# Patient Record
Sex: MALE | URBAN - METROPOLITAN AREA
[De-identification: names, ages, dates, MRNs, and addresses within clinical notes are randomized per-mention and may not be internally consistent; named-entity substitution may affect disease eponyms.]

---

## 2017-03-22 ENCOUNTER — INPATIENT (INPATIENT)
Facility: HOSPITAL | Age: 77
LOS: 1 days | Discharge: PSYCHIATRIC OR SUBSTANCE USE FACILITY | End: 2017-03-24
Attending: HOSPITALIST | Admitting: HOSPITALIST
Payer: MEDICAID

## 2017-03-22 VITALS
SYSTOLIC BLOOD PRESSURE: 124 MMHG | DIASTOLIC BLOOD PRESSURE: 73 MMHG | TEMPERATURE: 99 F | OXYGEN SATURATION: 97 % | RESPIRATION RATE: 18 BRPM | HEART RATE: 60 BPM

## 2017-03-22 DIAGNOSIS — R06.00 DYSPNEA, UNSPECIFIED: ICD-10-CM

## 2017-03-22 LAB
ALBUMIN SERPL ELPH-MCNC: 4.2 G/DL — SIGNIFICANT CHANGE UP (ref 3.3–5)
ALP SERPL-CCNC: 47 U/L — SIGNIFICANT CHANGE UP (ref 40–120)
ALT FLD-CCNC: 32 U/L — SIGNIFICANT CHANGE UP (ref 4–41)
APTT BLD: 31.9 SEC — SIGNIFICANT CHANGE UP (ref 27.5–37.4)
AST SERPL-CCNC: 26 U/L — SIGNIFICANT CHANGE UP (ref 4–40)
BASOPHILS # BLD AUTO: 0.03 K/UL — SIGNIFICANT CHANGE UP (ref 0–0.2)
BASOPHILS NFR BLD AUTO: 0.4 % — SIGNIFICANT CHANGE UP (ref 0–2)
BILIRUB SERPL-MCNC: 1.5 MG/DL — HIGH (ref 0.2–1.2)
BUN SERPL-MCNC: 13 MG/DL — SIGNIFICANT CHANGE UP (ref 7–23)
CALCIUM SERPL-MCNC: 9.7 MG/DL — SIGNIFICANT CHANGE UP (ref 8.4–10.5)
CHLORIDE SERPL-SCNC: 103 MMOL/L — SIGNIFICANT CHANGE UP (ref 98–107)
CK MB BLD-MCNC: 1.17 NG/ML — SIGNIFICANT CHANGE UP (ref 1–6.6)
CK SERPL-CCNC: 69 U/L — SIGNIFICANT CHANGE UP (ref 30–200)
CO2 SERPL-SCNC: 27 MMOL/L — SIGNIFICANT CHANGE UP (ref 22–31)
CREAT SERPL-MCNC: 0.87 MG/DL — SIGNIFICANT CHANGE UP (ref 0.5–1.3)
EOSINOPHIL # BLD AUTO: 0.21 K/UL — SIGNIFICANT CHANGE UP (ref 0–0.5)
EOSINOPHIL NFR BLD AUTO: 3.1 % — SIGNIFICANT CHANGE UP (ref 0–6)
GLUCOSE SERPL-MCNC: 83 MG/DL — SIGNIFICANT CHANGE UP (ref 70–99)
HCT VFR BLD CALC: 43.2 % — SIGNIFICANT CHANGE UP (ref 39–50)
HGB BLD-MCNC: 15.7 G/DL — SIGNIFICANT CHANGE UP (ref 13–17)
IMM GRANULOCYTES NFR BLD AUTO: 0.3 % — SIGNIFICANT CHANGE UP (ref 0–1.5)
INR BLD: 1.07 — SIGNIFICANT CHANGE UP (ref 0.88–1.17)
LYMPHOCYTES # BLD AUTO: 2.1 K/UL — SIGNIFICANT CHANGE UP (ref 1–3.3)
LYMPHOCYTES # BLD AUTO: 31.4 % — SIGNIFICANT CHANGE UP (ref 13–44)
MCHC RBC-ENTMCNC: 32.4 PG — SIGNIFICANT CHANGE UP (ref 27–34)
MCHC RBC-ENTMCNC: 36.3 % — HIGH (ref 32–36)
MCV RBC AUTO: 89.3 FL — SIGNIFICANT CHANGE UP (ref 80–100)
MONOCYTES # BLD AUTO: 0.54 K/UL — SIGNIFICANT CHANGE UP (ref 0–0.9)
MONOCYTES NFR BLD AUTO: 8.1 % — SIGNIFICANT CHANGE UP (ref 2–14)
NEUTROPHILS # BLD AUTO: 3.78 K/UL — SIGNIFICANT CHANGE UP (ref 1.8–7.4)
NEUTROPHILS NFR BLD AUTO: 56.7 % — SIGNIFICANT CHANGE UP (ref 43–77)
PLATELET # BLD AUTO: 161 K/UL — SIGNIFICANT CHANGE UP (ref 150–400)
PMV BLD: 10.5 FL — SIGNIFICANT CHANGE UP (ref 7–13)
POTASSIUM SERPL-MCNC: 4.1 MMOL/L — SIGNIFICANT CHANGE UP (ref 3.5–5.3)
POTASSIUM SERPL-SCNC: 4.1 MMOL/L — SIGNIFICANT CHANGE UP (ref 3.5–5.3)
PROT SERPL-MCNC: 7.2 G/DL — SIGNIFICANT CHANGE UP (ref 6–8.3)
PROTHROM AB SERPL-ACNC: 12 SEC — SIGNIFICANT CHANGE UP (ref 9.8–13.1)
RBC # BLD: 4.84 M/UL — SIGNIFICANT CHANGE UP (ref 4.2–5.8)
RBC # FLD: 12.8 % — SIGNIFICANT CHANGE UP (ref 10.3–14.5)
SODIUM SERPL-SCNC: 142 MMOL/L — SIGNIFICANT CHANGE UP (ref 135–145)
TROPONIN T SERPL-MCNC: < 0.06 NG/ML — SIGNIFICANT CHANGE UP (ref 0–0.06)
TROPONIN T SERPL-MCNC: < 0.06 NG/ML — SIGNIFICANT CHANGE UP (ref 0–0.06)
WBC # BLD: 6.68 K/UL — SIGNIFICANT CHANGE UP (ref 3.8–10.5)
WBC # FLD AUTO: 6.68 K/UL — SIGNIFICANT CHANGE UP (ref 3.8–10.5)

## 2017-03-22 PROCEDURE — 71020: CPT | Mod: 26

## 2017-03-22 RX ORDER — ATORVASTATIN CALCIUM 80 MG/1
20 TABLET, FILM COATED ORAL AT BEDTIME
Qty: 0 | Refills: 0 | Status: DISCONTINUED | OUTPATIENT
Start: 2017-03-22 | End: 2017-03-24

## 2017-03-22 RX ORDER — MULTIVIT WITH MIN/MFOLATE/K2 340-15/3 G
300 POWDER (GRAM) ORAL
Qty: 0 | Refills: 0 | COMMUNITY

## 2017-03-22 RX ORDER — HALOPERIDOL DECANOATE 100 MG/ML
10 INJECTION INTRAMUSCULAR AT BEDTIME
Qty: 0 | Refills: 0 | Status: DISCONTINUED | OUTPATIENT
Start: 2017-03-22 | End: 2017-03-24

## 2017-03-22 RX ORDER — DOCUSATE SODIUM 100 MG
2 CAPSULE ORAL
Qty: 0 | Refills: 0 | COMMUNITY

## 2017-03-22 RX ORDER — DOCUSATE SODIUM 100 MG
100 CAPSULE ORAL
Qty: 0 | Refills: 0 | Status: DISCONTINUED | OUTPATIENT
Start: 2017-03-22 | End: 2017-03-24

## 2017-03-22 RX ORDER — HALOPERIDOL DECANOATE 100 MG/ML
1 INJECTION INTRAMUSCULAR
Qty: 0 | Refills: 0 | COMMUNITY

## 2017-03-22 RX ORDER — ASPIRIN/CALCIUM CARB/MAGNESIUM 324 MG
81 TABLET ORAL DAILY
Qty: 0 | Refills: 0 | Status: DISCONTINUED | OUTPATIENT
Start: 2017-03-22 | End: 2017-03-24

## 2017-03-22 RX ORDER — ROSUVASTATIN CALCIUM 5 MG/1
1 TABLET ORAL
Qty: 0 | Refills: 0 | COMMUNITY

## 2017-03-22 RX ORDER — ASPIRIN/CALCIUM CARB/MAGNESIUM 324 MG
1 TABLET ORAL
Qty: 0 | Refills: 0 | COMMUNITY

## 2017-03-22 NOTE — ED ADULT NURSE NOTE - OBJECTIVE STATEMENT
Received pt. in rm 4, A&Ox4, presents to ER from [facility] with 2 aides at bedside. c/o sob, dizziness and nausea since last night. Denies cp, palpitations, vomiting or fever/chills. #20G IV placed in left AC; labs sent; MD at bedside for eval. VS done as charted; breathing well on RA, non-labored. Will continue to monitor. Received pt. in rm 4, A&Ox4, presents to ER from [facility] with 2 aides at bedside. Pt. primarily mandarin speaking; aide at bedside translating. c/o sob, dizziness and nausea since last night. Denies cp, palpitations, vomiting or fever/chills. #20G IV placed in left AC; labs sent; MD at bedside for eval. VS done as charted; breathing well on RA, non-labored. Will continue to monitor.

## 2017-03-22 NOTE — ED PROVIDER NOTE - OBJECTIVE STATEMENT
C/o dyspnea since last night.  Sx woke up patient yesterday evening and persisted into this morning. denies chest pain, only dyspnea.  No nausea.  No other complaints.  No h/o cad or other cardiac history, per pt

## 2017-03-22 NOTE — ED PROVIDER NOTE - MEDICAL DECISION MAKING DETAILS
dyspnea - no cp but poor historian and pt is elderly.  Will check d-dimer out of concern for pe, and r/o acs dyspnea - no cp but poor historian and pt is elderly.  Will check d-dimer out of concern for pe, and r/o acs.  D Dimer WNL, suggesting PE unlikely. Initial troponin negative and HEART score 3 (low) based on risk factors of age and HLD. He has no acute radiographic findings on plain film (though formal read pending). Plan for repeat troponin to trend.

## 2017-03-22 NOTE — ED ADULT NURSE REASSESSMENT NOTE - NS ED NURSE REASSESS COMMENT FT1
Received report from Manolo Mccall. Pt [id number] with credesmore aids at bedside. NSR on cardiac monitor. Pt offers no complaints. Will monitor.

## 2017-03-22 NOTE — ED PROVIDER NOTE - NEUROLOGICAL SPEECH
([facility] staff interpreting Mandarin, but speech appears appropriate, and Pt understands and speaks some basic English)/clear

## 2017-03-22 NOTE — ED PROVIDER NOTE - PROGRESS NOTE DETAILS
MD Bettye - Initial troponin negative. D Dimer also WNL. No acute findings on CXR (formal read pending). Pt denies CP, reiterates SOB. HEART Score low (only risk factor is age and HLD). Therefore, plan for repeat (4 hour) troponin. tele PA communicated with

## 2017-03-22 NOTE — ED ADULT TRIAGE NOTE - CHIEF COMPLAINT QUOTE
arrives from [facility] (h/o schizophrenia).  c/o SOB "in the morning" x 2 days. states he feels it when he takes a deep breath..." a chest tightness."   denies fever/cough.

## 2017-03-22 NOTE — ED PROVIDER NOTE - SHIFT CHANGE DETAILS
signed out pending second set of cardiac enzymes.  If negative, may be d/krish home as I feel this is a very low probability for acs

## 2017-03-22 NOTE — ED ADULT NURSE NOTE - CHIEF COMPLAINT QUOTE
arrives from [facility] (h/o schizophrenia).  c/o SOB "in the morning" x 2 days. states he feels it when he takes a deep breath..." a chest tightness."   denies fever/cough.

## 2017-03-23 DIAGNOSIS — F25.9 SCHIZOAFFECTIVE DISORDER, UNSPECIFIED: ICD-10-CM

## 2017-03-23 DIAGNOSIS — R00.1 BRADYCARDIA, UNSPECIFIED: ICD-10-CM

## 2017-03-23 DIAGNOSIS — R06.00 DYSPNEA, UNSPECIFIED: ICD-10-CM

## 2017-03-23 DIAGNOSIS — Z41.8 ENCOUNTER FOR OTHER PROCEDURES FOR PURPOSES OTHER THAN REMEDYING HEALTH STATE: ICD-10-CM

## 2017-03-23 DIAGNOSIS — E78.00 PURE HYPERCHOLESTEROLEMIA, UNSPECIFIED: ICD-10-CM

## 2017-03-23 LAB
24R-OH-CALCIDIOL SERPL-MCNC: 25.2 NG/ML — LOW (ref 30–100)
BASOPHILS # BLD AUTO: 0.03 K/UL — SIGNIFICANT CHANGE UP (ref 0–0.2)
BASOPHILS NFR BLD AUTO: 0.3 % — SIGNIFICANT CHANGE UP (ref 0–2)
BUN SERPL-MCNC: 13 MG/DL — SIGNIFICANT CHANGE UP (ref 7–23)
CALCIUM SERPL-MCNC: 9.3 MG/DL — SIGNIFICANT CHANGE UP (ref 8.4–10.5)
CHLORIDE SERPL-SCNC: 104 MMOL/L — SIGNIFICANT CHANGE UP (ref 98–107)
CHOLEST SERPL-MCNC: 132 MG/DL — SIGNIFICANT CHANGE UP (ref 120–199)
CK MB BLD-MCNC: 1 NG/ML — SIGNIFICANT CHANGE UP (ref 1–6.6)
CK MB BLD-MCNC: SIGNIFICANT CHANGE UP (ref 0–2.5)
CK SERPL-CCNC: 57 U/L — SIGNIFICANT CHANGE UP (ref 30–200)
CO2 SERPL-SCNC: 22 MMOL/L — SIGNIFICANT CHANGE UP (ref 22–31)
CREAT SERPL-MCNC: 0.69 MG/DL — SIGNIFICANT CHANGE UP (ref 0.5–1.3)
EOSINOPHIL # BLD AUTO: 0.4 K/UL — SIGNIFICANT CHANGE UP (ref 0–0.5)
EOSINOPHIL NFR BLD AUTO: 4.5 % — SIGNIFICANT CHANGE UP (ref 0–6)
GLUCOSE SERPL-MCNC: 102 MG/DL — HIGH (ref 70–99)
HBA1C BLD-MCNC: 5.5 % — SIGNIFICANT CHANGE UP (ref 4–5.6)
HCT VFR BLD CALC: 42.5 % — SIGNIFICANT CHANGE UP (ref 39–50)
HDLC SERPL-MCNC: 40 MG/DL — SIGNIFICANT CHANGE UP (ref 35–55)
HGB BLD-MCNC: 15.3 G/DL — SIGNIFICANT CHANGE UP (ref 13–17)
IMM GRANULOCYTES NFR BLD AUTO: 0.3 % — SIGNIFICANT CHANGE UP (ref 0–1.5)
LIPID PNL WITH DIRECT LDL SERPL: 81 MG/DL — SIGNIFICANT CHANGE UP
LYMPHOCYTES # BLD AUTO: 2.69 K/UL — SIGNIFICANT CHANGE UP (ref 1–3.3)
LYMPHOCYTES # BLD AUTO: 30.4 % — SIGNIFICANT CHANGE UP (ref 13–44)
MAGNESIUM SERPL-MCNC: 2.3 MG/DL — SIGNIFICANT CHANGE UP (ref 1.6–2.6)
MCHC RBC-ENTMCNC: 31.7 PG — SIGNIFICANT CHANGE UP (ref 27–34)
MCHC RBC-ENTMCNC: 36 % — SIGNIFICANT CHANGE UP (ref 32–36)
MCV RBC AUTO: 88 FL — SIGNIFICANT CHANGE UP (ref 80–100)
MONOCYTES # BLD AUTO: 0.49 K/UL — SIGNIFICANT CHANGE UP (ref 0–0.9)
MONOCYTES NFR BLD AUTO: 5.5 % — SIGNIFICANT CHANGE UP (ref 2–14)
NEUTROPHILS # BLD AUTO: 5.22 K/UL — SIGNIFICANT CHANGE UP (ref 1.8–7.4)
NEUTROPHILS NFR BLD AUTO: 59 % — SIGNIFICANT CHANGE UP (ref 43–77)
NT-PROBNP SERPL-SCNC: 19.92 PG/ML — SIGNIFICANT CHANGE UP
PHOSPHATE SERPL-MCNC: 2.6 MG/DL — SIGNIFICANT CHANGE UP (ref 2.5–4.5)
PLATELET # BLD AUTO: 158 K/UL — SIGNIFICANT CHANGE UP (ref 150–400)
PMV BLD: 10.5 FL — SIGNIFICANT CHANGE UP (ref 7–13)
POTASSIUM SERPL-MCNC: 3.7 MMOL/L — SIGNIFICANT CHANGE UP (ref 3.5–5.3)
POTASSIUM SERPL-SCNC: 3.7 MMOL/L — SIGNIFICANT CHANGE UP (ref 3.5–5.3)
RBC # BLD: 4.83 M/UL — SIGNIFICANT CHANGE UP (ref 4.2–5.8)
RBC # FLD: 12.7 % — SIGNIFICANT CHANGE UP (ref 10.3–14.5)
SODIUM SERPL-SCNC: 142 MMOL/L — SIGNIFICANT CHANGE UP (ref 135–145)
TRIGL SERPL-MCNC: 109 MG/DL — SIGNIFICANT CHANGE UP (ref 10–149)
TROPONIN T SERPL-MCNC: < 0.06 NG/ML — SIGNIFICANT CHANGE UP (ref 0–0.06)
TSH SERPL-MCNC: 0.94 UIU/ML — SIGNIFICANT CHANGE UP (ref 0.27–4.2)
WBC # BLD: 8.86 K/UL — SIGNIFICANT CHANGE UP (ref 3.8–10.5)
WBC # FLD AUTO: 8.86 K/UL — SIGNIFICANT CHANGE UP (ref 3.8–10.5)

## 2017-03-23 PROCEDURE — 99232 SBSQ HOSP IP/OBS MODERATE 35: CPT

## 2017-03-23 PROCEDURE — 99223 1ST HOSP IP/OBS HIGH 75: CPT | Mod: GC

## 2017-03-23 RX ORDER — HEPARIN SODIUM 5000 [USP'U]/ML
5000 INJECTION INTRAVENOUS; SUBCUTANEOUS EVERY 12 HOURS
Qty: 0 | Refills: 0 | Status: DISCONTINUED | OUTPATIENT
Start: 2017-03-23 | End: 2017-03-24

## 2017-03-23 RX ORDER — SODIUM CHLORIDE 9 MG/ML
3 INJECTION INTRAMUSCULAR; INTRAVENOUS; SUBCUTANEOUS EVERY 8 HOURS
Qty: 0 | Refills: 0 | Status: DISCONTINUED | OUTPATIENT
Start: 2017-03-23 | End: 2017-03-24

## 2017-03-23 RX ADMIN — HEPARIN SODIUM 5000 UNIT(S): 5000 INJECTION INTRAVENOUS; SUBCUTANEOUS at 17:43

## 2017-03-23 RX ADMIN — Medication 100 MILLIGRAM(S): at 17:43

## 2017-03-23 RX ADMIN — SODIUM CHLORIDE 3 MILLILITER(S): 9 INJECTION INTRAMUSCULAR; INTRAVENOUS; SUBCUTANEOUS at 05:18

## 2017-03-23 RX ADMIN — Medication 100 MILLIGRAM(S): at 05:18

## 2017-03-23 RX ADMIN — SODIUM CHLORIDE 3 MILLILITER(S): 9 INJECTION INTRAMUSCULAR; INTRAVENOUS; SUBCUTANEOUS at 21:47

## 2017-03-23 RX ADMIN — Medication 81 MILLIGRAM(S): at 11:01

## 2017-03-23 RX ADMIN — SODIUM CHLORIDE 3 MILLILITER(S): 9 INJECTION INTRAMUSCULAR; INTRAVENOUS; SUBCUTANEOUS at 13:52

## 2017-03-23 RX ADMIN — HALOPERIDOL DECANOATE 10 MILLIGRAM(S): 100 INJECTION INTRAMUSCULAR at 21:48

## 2017-03-23 RX ADMIN — HEPARIN SODIUM 5000 UNIT(S): 5000 INJECTION INTRAVENOUS; SUBCUTANEOUS at 05:18

## 2017-03-23 RX ADMIN — ATORVASTATIN CALCIUM 20 MILLIGRAM(S): 80 TABLET, FILM COATED ORAL at 21:48

## 2017-03-23 NOTE — H&P ADULT. - HISTORY OF PRESENT ILLNESS
75 y/o M with hx of schizoaffective disorder, HLD presents with episodes of dyspnea since last night. Patient is a poor historian and accompanied by staff from [facility] who translated. Patient states he had three episodes of dyspnea which resolved after 10-15 mins wtihout intervention. He states he has no HODGE. Denies fever, chills, cough, chest pain, falls, LOC, abdominal pain, hematochezia, LE edema, dysuria, dysuria, nausea, or vomiting. 75 y/o M with hx of schizoaffective disorder, HLD presents with episodes of dyspnea since last night. Patient is a poor historian and accompanied by staff from [facility] who translated. Patient states he had three episodes of dyspnea which resolved after 10-15 mins wtihout intervention. He states he has no HODGE. Denies fever, chills, cough, chest pain, falls, LOC, abdominal pain, hematochezia, LE edema, dysuria, dysuria, nausea, or vomiting.    On admission: sitting in bed, comfortable, no complaints. 77 y/o M with hx of schizoaffective disorder, HLD presents with episodes of dyspnea since last night. Patient is a poor historian and accompanied by staff from [facility] who translated. Patient states he had three episodes of dyspnea which resolved after 10-15 mins without intervention. He states he has no HODGE. Denies fever, chills, cough, chest pain, falls, LOC, abdominal pain, hematochezia, LE edema, dysuria, dysuria, nausea, or vomiting.    On admission: sitting in bed, comfortable, no complaints.

## 2017-03-23 NOTE — H&P ADULT. - NEGATIVE OPHTHALMOLOGIC SYMPTOMS
no blurred vision R/no diplopia/no photophobia/no lacrimation L/no blurred vision L/no lacrimation R

## 2017-03-23 NOTE — H&P ADULT. - PROBLEM SELECTOR PLAN 1
Admit to tele to r/o ACS  check cbc, bmp, a1c, flp, tsh, trend CE  Pro BNP added on  consider echo and ischemic eval  f/u MD Note Admit to tele to r/o ACS  check cbc, bmp, a1c, flp, tsh, trend CE  Pro BNP added on  Echo ordered  consider ischemic eval   f/u MD Note Report of dyspnea x one day  No associated chest pain  Admit to tele to r/o ACS  check cbc, bmp, a1c, flp, tsh, trend CE  Pro BNP added on  Echo ordered  consider ischemic eval   f/u MD Note  HOB elevation

## 2017-03-23 NOTE — H&P ADULT. - PROBLEM SELECTOR PLAN 2
cotn statin  check flp Monitor on tele  Patient is not on any rate controlling meds.   will check TSH in AM Asymptomatic presently  Patient is not on any rate controlling meds.   Monitor on tele  will check TSH in AM

## 2017-03-23 NOTE — H&P ADULT. - ASSESSMENT
75 y/o M with hx of schizoaffective disorder, HLD presents with episodes of dyspnea since last night admitted to r/o ACS

## 2017-03-24 VITALS
RESPIRATION RATE: 18 BRPM | HEART RATE: 65 BPM | OXYGEN SATURATION: 97 % | TEMPERATURE: 98 F | DIASTOLIC BLOOD PRESSURE: 48 MMHG | SYSTOLIC BLOOD PRESSURE: 93 MMHG

## 2017-03-24 PROBLEM — Z00.00 ENCOUNTER FOR PREVENTIVE HEALTH EXAMINATION: Status: ACTIVE | Noted: 2017-03-24

## 2017-03-24 PROBLEM — E78.00 PURE HYPERCHOLESTEROLEMIA, UNSPECIFIED: Chronic | Status: ACTIVE | Noted: 2017-03-22

## 2017-03-24 LAB
BUN SERPL-MCNC: 13 MG/DL — SIGNIFICANT CHANGE UP (ref 7–23)
CALCIUM SERPL-MCNC: 9.3 MG/DL — SIGNIFICANT CHANGE UP (ref 8.4–10.5)
CHLORIDE SERPL-SCNC: 106 MMOL/L — SIGNIFICANT CHANGE UP (ref 98–107)
CO2 SERPL-SCNC: 23 MMOL/L — SIGNIFICANT CHANGE UP (ref 22–31)
CREAT SERPL-MCNC: 0.66 MG/DL — SIGNIFICANT CHANGE UP (ref 0.5–1.3)
GLUCOSE SERPL-MCNC: 110 MG/DL — HIGH (ref 70–99)
HCT VFR BLD CALC: 41.9 % — SIGNIFICANT CHANGE UP (ref 39–50)
HGB BLD-MCNC: 15 G/DL — SIGNIFICANT CHANGE UP (ref 13–17)
MCHC RBC-ENTMCNC: 31.6 PG — SIGNIFICANT CHANGE UP (ref 27–34)
MCHC RBC-ENTMCNC: 35.8 % — SIGNIFICANT CHANGE UP (ref 32–36)
MCV RBC AUTO: 88.4 FL — SIGNIFICANT CHANGE UP (ref 80–100)
PLATELET # BLD AUTO: 150 K/UL — SIGNIFICANT CHANGE UP (ref 150–400)
PMV BLD: 10.5 FL — SIGNIFICANT CHANGE UP (ref 7–13)
POTASSIUM SERPL-MCNC: 3.7 MMOL/L — SIGNIFICANT CHANGE UP (ref 3.5–5.3)
POTASSIUM SERPL-SCNC: 3.7 MMOL/L — SIGNIFICANT CHANGE UP (ref 3.5–5.3)
RBC # BLD: 4.74 M/UL — SIGNIFICANT CHANGE UP (ref 4.2–5.8)
RBC # FLD: 12.9 % — SIGNIFICANT CHANGE UP (ref 10.3–14.5)
SODIUM SERPL-SCNC: 143 MMOL/L — SIGNIFICANT CHANGE UP (ref 135–145)
WBC # BLD: 6.4 K/UL — SIGNIFICANT CHANGE UP (ref 3.8–10.5)
WBC # FLD AUTO: 6.4 K/UL — SIGNIFICANT CHANGE UP (ref 3.8–10.5)

## 2017-03-24 PROCEDURE — 99239 HOSP IP/OBS DSCHRG MGMT >30: CPT

## 2017-03-24 RX ADMIN — SODIUM CHLORIDE 3 MILLILITER(S): 9 INJECTION INTRAMUSCULAR; INTRAVENOUS; SUBCUTANEOUS at 06:36

## 2017-03-24 RX ADMIN — SODIUM CHLORIDE 3 MILLILITER(S): 9 INJECTION INTRAMUSCULAR; INTRAVENOUS; SUBCUTANEOUS at 11:23

## 2017-03-24 RX ADMIN — HEPARIN SODIUM 5000 UNIT(S): 5000 INJECTION INTRAVENOUS; SUBCUTANEOUS at 06:36

## 2017-03-24 RX ADMIN — Medication 81 MILLIGRAM(S): at 11:25

## 2017-03-24 RX ADMIN — Medication 100 MILLIGRAM(S): at 06:36

## 2017-03-24 NOTE — DISCHARGE NOTE ADULT - ADDITIONAL INSTRUCTIONS
[facility] Medicine Clinic appointment April 7th at 10am. [phone]

## 2017-03-24 NOTE — DISCHARGE NOTE ADULT - CARE PROVIDER_API CALL
[facility] Cardiology Clinic,   [facility]  4th Floor Oncology Building  [address] [place] [address]  Phone: [phone]  Fax: (   )    - [facility] Cardiology Clinic,   [facility]  4th Floor Oncology Building  [address] [place] [address]  Phone: [phone]  Fax: (   )    -    [facility] Medicine Clinic,   [facility]  3rd floor Oncology Building  Phone: [phone]  Fax: (   )    -

## 2017-03-24 NOTE — DISCHARGE NOTE ADULT - HOSPITAL COURSE
77 y/o M with hx of schizoaffective disorder, HLD presents with episodes of dyspnea since last night. Patient is a poor historian and accompanied by staff from [place] who translated. Patient states he had three episodes of dyspnea which resolved after 10-15mins without intervention. He states he has no HODGE. Denies fever, chills, cough, chest pain, falls, LOC, abdominal pain, hematochezia, LE edema, dysuria, dysuria, nausea, or vomiting.    On admission: EKG: Sinus matteo @ 54 BPM, Flat T in V5, V6, AVL. ACS ruled out by negative cardiac enzymes. D-Dimer negative. ProBNP: 19.42. CXR: clear lungs. No pleural effusions or pneumothorax. Indistinct heart borders due to epicardial fat limits accurate assessment of heart size however does not appear grossly enlarged. Slightly tortuous descending thoracic aorta. Unremarkable hilar shadows. Trachea midline. Unremarkable osseous structures. Medicine: Monitor on tele today, if no events, then d/c back to [place] [facility] tomorrow.    Case discussed with Dr. Wilkinson, labs/vitals reviewed, Pt medically cleared for discharge back to [facility]. 77 y/o M with hx of schizoaffective disorder, HLD presents with episodes of dyspnea since last night. Patient is a poor historian and accompanied by staff from [facility] who translated. Patient states he had three episodes of dyspnea which resolved after 10-15mins without intervention. He states he has no HODGE. Denies fever, chills, cough, chest pain, falls, LOC, abdominal pain, hematochezia, LE edema, dysuria, dysuria, nausea, or vomiting.    On admission: EKG: Sinus matteo @ 54 BPM, Flat T in V5, V6, AVL. ACS ruled out by negative cardiac enzymes. D-Dimer negative. ProBNP: 19.42. CXR: clear lungs. No pleural effusions or pneumothorax. Indistinct heart borders due to epicardial fat limits accurate assessment of heart size however does not appear grossly enlarged. Slightly tortuous descending thoracic aorta. Unremarkable hilar shadows. Trachea midline. Unremarkable osseous structures. Medicine: Monitor on tele today, if no events, then d/c back to [facility] tomorrow. No tele events recorded.     Case discussed with Dr. Wilkinson, labs/vitals reviewed, Pt medically cleared for discharge back to [facility]. 75 y/o M with hx of schizoaffective disorder, HLD presents with episodes of dyspnea since last night. Patient is a poor historian and accompanied by staff from [place] who translated. Patient states he had three episodes of dyspnea which resolved after 10-15mins without intervention. He states he has no HODGE. Denies fever, chills, cough, chest pain, falls, LOC, abdominal pain, hematochezia, LE edema, dysuria, dysuria, nausea, or vomiting.    On admission: EKG: Sinus matteo @ 54 BPM, Flat T in V5, V6, AVL. ACS ruled out by negative cardiac enzymes. D-Dimer negative. ProBNP: 19.42. CXR: clear lungs. No pleural effusions or pneumothorax. Indistinct heart borders due to epicardial fat limits accurate assessment of heart size however does not appear grossly enlarged. Slightly tortuous descending thoracic aorta. Unremarkable hilar shadows. Trachea midline. Unremarkable osseous structures. Medicine: Monitor on tele today, if no events, then d/c back to [facility] tomorrow. Tele shows NSR HR 60-70, transient periods of bradycardia, asymptomatic.     Case discussed with Dr. Wilkinson, labs/vitals reviewed, Pt medically cleared for discharge back to [place]. Appointment arranged in medicine clinic at [facility], cardiology clinic office information provided to arrange an appointment as requested by Nadia BARBOZA.

## 2017-03-24 NOTE — DISCHARGE NOTE ADULT - CARE PLAN
Principal Discharge DX:	Dyspnea, unspecified type  Goal:	Your inpatient work up was negative.  Instructions for follow-up, activity and diet:	Follow up with your primary care physician for further monitoring in 1-2 weeks. Please call to arrange appointment.  Secondary Diagnosis:	Hypercholesterolemia  Goal:	Continue Crestor. Goal LDL <100  Instructions for follow-up, activity and diet:	Follow up with your primary care physician for further monitoring in 1-2 weeks. Please call to arrange appointment.  Secondary Diagnosis:	Schizoaffective disorder  Instructions for follow-up, activity and diet:	Follow up with your primary care physician for further monitoring in 1-2 weeks. Please call to arrange appointment.  Secondary Diagnosis:	Hyperlipidemia  Instructions for follow-up, activity and diet:	Follow up with your primary care physician for further monitoring in 1-2 weeks. Please call to arrange appointment. Principal Discharge DX:	Dyspnea, unspecified type  Goal:	Your inpatient work up was negative.  Instructions for follow-up, activity and diet:	Follow up with your primary care physician for further monitoring in 1-2 weeks. Please call to arrange appointment.  Secondary Diagnosis:	Hypercholesterolemia  Goal:	Continue Crestor. Goal LDL <100  Instructions for follow-up, activity and diet:	Follow up with your primary care physician for further monitoring in 1-2 weeks. Please call to arrange appointment.  Secondary Diagnosis:	Schizoaffective disorder  Goal:	Continue haldol as you were previously taking at [place]  Instructions for follow-up, activity and diet:	Follow up with your primary care physician for further monitoring in 1-2 weeks. Please call to arrange appointment.  Secondary Diagnosis:	Hyperlipidemia  Goal:	Continue Crestor at bedtime. Goal LDL <100  Instructions for follow-up, activity and diet:	Follow up with your primary care physician for further monitoring in 1-2 weeks. Please call to arrange appointment. Principal Discharge DX:	Dyspnea, unspecified type  Goal:	Your inpatient work up was negative.  Instructions for follow-up, activity and diet:	[facility] Medicine Clinic appointment arranged for April 7th at 10am. Please call the office with any questions, [phone]. To arrange an appointment at the Cardiology Clinic please call [phone].  Secondary Diagnosis:	Hypercholesterolemia  Goal:	Continue Crestor. Goal LDL <100  Instructions for follow-up, activity and diet:	[facility] Medicine Clinic appointment arranged for April 7th at 10am. Please call the office with any questions, [phone]. Low cholesterol diet.  Secondary Diagnosis:	Schizoaffective disorder  Goal:	Continue haldol as you were previously taking at [place]  Instructions for follow-up, activity and diet:	Follow up with your psychiatrist at [place] for further monitoring in 1-2 weeks. Please call to arrange appointment.  Secondary Diagnosis:	Hyperlipidemia  Goal:	Continue Crestor at bedtime. Goal LDL <100  Instructions for follow-up, activity and diet:	[facility] Medicine Clinic appointment arranged for April 7th at 10am. Please call the office with any questions, [phone]. Principal Discharge DX:	Dyspnea, unspecified type  Goal:	Your inpatient work up was negative.  Instructions for follow-up, activity and diet:	[facility] Medicine Clinic appointment arranged for April 7th at 10am. Please call the office with any questions, [phone]. To arrange an appointment at the Cardiology Clinic please call [phone].  Secondary Diagnosis:	Hypercholesterolemia  Goal:	Continue Crestor. Goal LDL <100  Instructions for follow-up, activity and diet:	[facility] Medicine Clinic appointment arranged for April 7th at 10am. Please call the office with any questions, [phone]. Low cholesterol diet.  Secondary Diagnosis:	Schizoaffective disorder  Goal:	Continue haldol as you were previously taking at [place]  Instructions for follow-up, activity and diet:	Follow up with your psychiatrist at [place] for further monitoring in 1-2 weeks. Please call to arrange appointment.  Secondary Diagnosis:	Hyperlipidemia  Goal:	Continue Crestor at bedtime. Goal LDL <100  Instructions for follow-up, activity and diet:	[facility] Medicine Clinic appointment arranged for April 7th at 10am. Please call the office with any questions, [phone].

## 2017-03-24 NOTE — DISCHARGE NOTE ADULT - PATIENT PORTAL LINK FT
“You can access the Follow[employer] Patient Portal, offered by [facility], by registering with the following website: [email or web address]”

## 2017-03-24 NOTE — DISCHARGE NOTE ADULT - PLAN OF CARE
Follow up with your primary care physician for further monitoring in 1-2 weeks. Please call to arrange appointment. Your inpatient work up was negative. Continue Crestor. Goal LDL <100 Continue haldol as you were previously taking at [place] Continue Crestor at bedtime. Goal LDL <100 [facility] Medicine Clinic appointment arranged for April 7th at 10am. Please call the office with any questions, [phone]. To arrange an appointment at the Cardiology Clinic please call [phone]. [facility] Medicine Clinic appointment arranged for April 7th at 10am. Please call the office with any questions, [phone]. Low cholesterol diet. Follow up with your psychiatrist at [place] for further monitoring in 1-2 weeks. Please call to arrange appointment. [facility] Medicine Clinic appointment arranged for April 7th at 10am. Please call the office with any questions, [phone].

## 2017-03-24 NOTE — DISCHARGE NOTE ADULT - PROVIDER TOKENS
FREE:[LAST:[[facility] Cardiology Clinic],PHONE:[phone]],FAX:[(   )    -],ADDRESS:[[facility]  4th Floor Oncology Building  [facility] [place] [address]]] FREE:[LAST:[[facility] Cardiology Clinic],PHONE:[phone]],FAX:[(   )    -],ADDRESS:[[facility]  4th Floor Oncology Building  [facility] [place] [address]]],FREE:[LAST:[[facility] Medicine [facility]],PHONE:[phone]],FAX:[(   )    -],ADDRESS:[[facility]  3rd floor Oncology Building]]

## 2017-03-24 NOTE — DISCHARGE NOTE ADULT - MEDICATION SUMMARY - MEDICATIONS TO TAKE
I will START or STAY ON the medications listed below when I get home from the hospital:    aspirin 81 mg oral tablet  -- 1 tab(s) by mouth once a day  -- Indication: For Heart Health    Crestor 20 mg oral tablet  -- 1 tab(s) by mouth once a day (at bedtime)  -- Indication: For High cholesterol    haloperidol 10 mg oral tablet  -- 1 tab(s) by mouth once a day (at bedtime)  -- Indication: For Schizoaffective disorder    Colace 100 mg oral capsule  -- 2 cap(s) by mouth 2 times a day  -- Indication: For Constipation

## 2017-04-07 ENCOUNTER — APPOINTMENT (OUTPATIENT)
Dept: INTERNAL MEDICINE | Facility: HOSPITAL | Age: 77
End: 2017-04-07

## 2018-03-01 ENCOUNTER — OUTPATIENT (OUTPATIENT)
Dept: OUTPATIENT SERVICES | Facility: HOSPITAL | Age: 78
LOS: 1 days | End: 2018-03-01
Payer: MEDICAID

## 2018-03-01 PROCEDURE — G9001: CPT

## 2018-03-02 ENCOUNTER — EMERGENCY (EMERGENCY)
Facility: HOSPITAL | Age: 78
LOS: 1 days | Discharge: HOME | End: 2018-03-02
Attending: EMERGENCY MEDICINE | Admitting: EMERGENCY MEDICINE
Payer: MEDICAID

## 2018-03-02 VITALS
OXYGEN SATURATION: 100 % | RESPIRATION RATE: 20 BRPM | DIASTOLIC BLOOD PRESSURE: 70 MMHG | HEART RATE: 64 BPM | SYSTOLIC BLOOD PRESSURE: 102 MMHG

## 2018-03-02 VITALS
OXYGEN SATURATION: 100 % | RESPIRATION RATE: 12 BRPM | SYSTOLIC BLOOD PRESSURE: 119 MMHG | HEART RATE: 68 BPM | DIASTOLIC BLOOD PRESSURE: 80 MMHG

## 2018-03-02 LAB
ALBUMIN SERPL ELPH-MCNC: 3.4 G/DL — SIGNIFICANT CHANGE UP (ref 3.3–5)
ALP SERPL-CCNC: 46 U/L — SIGNIFICANT CHANGE UP (ref 40–120)
ALT FLD-CCNC: 37 U/L — SIGNIFICANT CHANGE UP (ref 4–41)
AMPHET UR-MCNC: NEGATIVE — SIGNIFICANT CHANGE UP
APAP SERPL-MCNC: < 15 UG/ML — LOW (ref 15–25)
APPEARANCE UR: CLEAR — SIGNIFICANT CHANGE UP
APTT BLD: 28.5 SEC — SIGNIFICANT CHANGE UP (ref 27.5–37.4)
AST SERPL-CCNC: 26 U/L — SIGNIFICANT CHANGE UP (ref 4–40)
BARBITURATES MEASUREMENT: NEGATIVE — SIGNIFICANT CHANGE UP
BARBITURATES UR SCN-MCNC: NEGATIVE — SIGNIFICANT CHANGE UP
BASE EXCESS BLDV CALC-SCNC: 2 MMOL/L — SIGNIFICANT CHANGE UP
BASOPHILS # BLD AUTO: 0.03 K/UL — SIGNIFICANT CHANGE UP (ref 0–0.2)
BASOPHILS NFR BLD AUTO: 0.5 % — SIGNIFICANT CHANGE UP (ref 0–2)
BENZODIAZ SERPL-MCNC: NEGATIVE — SIGNIFICANT CHANGE UP
BENZODIAZ UR-MCNC: NEGATIVE — SIGNIFICANT CHANGE UP
BILIRUB SERPL-MCNC: 0.9 MG/DL — SIGNIFICANT CHANGE UP (ref 0.2–1.2)
BILIRUB UR-MCNC: NEGATIVE — SIGNIFICANT CHANGE UP
BLOOD GAS VENOUS - CREATININE: 0.65 MG/DL — SIGNIFICANT CHANGE UP (ref 0.5–1.3)
BLOOD UR QL VISUAL: NEGATIVE — SIGNIFICANT CHANGE UP
BUN SERPL-MCNC: 13 MG/DL — SIGNIFICANT CHANGE UP (ref 7–23)
CALCIUM SERPL-MCNC: 8.3 MG/DL — LOW (ref 8.4–10.5)
CANNABINOIDS UR-MCNC: NEGATIVE — SIGNIFICANT CHANGE UP
CHLORIDE BLDV-SCNC: 109 MMOL/L — HIGH (ref 96–108)
CHLORIDE SERPL-SCNC: 107 MMOL/L — SIGNIFICANT CHANGE UP (ref 98–107)
CK MB BLD-MCNC: 1 NG/ML — SIGNIFICANT CHANGE UP (ref 1–6.6)
CK MB BLD-MCNC: SIGNIFICANT CHANGE UP (ref 0–2.5)
CK SERPL-CCNC: 59 U/L — SIGNIFICANT CHANGE UP (ref 30–200)
CO2 SERPL-SCNC: 25 MMOL/L — SIGNIFICANT CHANGE UP (ref 22–31)
COCAINE METAB.OTHER UR-MCNC: NEGATIVE — SIGNIFICANT CHANGE UP
COLOR SPEC: SIGNIFICANT CHANGE UP
CREAT SERPL-MCNC: 0.7 MG/DL — SIGNIFICANT CHANGE UP (ref 0.5–1.3)
EOSINOPHIL # BLD AUTO: 0.23 K/UL — SIGNIFICANT CHANGE UP (ref 0–0.5)
EOSINOPHIL NFR BLD AUTO: 3.8 % — SIGNIFICANT CHANGE UP (ref 0–6)
ETHANOL BLD-MCNC: < 10 MG/DL — SIGNIFICANT CHANGE UP
GAS PNL BLDV: 139 MMOL/L — SIGNIFICANT CHANGE UP (ref 136–146)
GLUCOSE BLDV-MCNC: 150 — HIGH (ref 70–99)
GLUCOSE SERPL-MCNC: 138 MG/DL — HIGH (ref 70–99)
GLUCOSE UR-MCNC: NEGATIVE — SIGNIFICANT CHANGE UP
HCO3 BLDV-SCNC: 26 MMOL/L — SIGNIFICANT CHANGE UP (ref 20–27)
HCT VFR BLD CALC: 41.1 % — SIGNIFICANT CHANGE UP (ref 39–50)
HCT VFR BLDV CALC: 44.2 % — SIGNIFICANT CHANGE UP (ref 39–51)
HGB BLD-MCNC: 13.9 G/DL — SIGNIFICANT CHANGE UP (ref 13–17)
HGB BLDV-MCNC: 14.4 G/DL — SIGNIFICANT CHANGE UP (ref 13–17)
IMM GRANULOCYTES # BLD AUTO: 0.03 # — SIGNIFICANT CHANGE UP
IMM GRANULOCYTES NFR BLD AUTO: 0.5 % — SIGNIFICANT CHANGE UP (ref 0–1.5)
INR BLD: 1.09 — SIGNIFICANT CHANGE UP (ref 0.88–1.17)
KETONES UR-MCNC: NEGATIVE — SIGNIFICANT CHANGE UP
LACTATE BLDV-MCNC: 1.7 MMOL/L — SIGNIFICANT CHANGE UP (ref 0.5–2)
LEUKOCYTE ESTERASE UR-ACNC: NEGATIVE — SIGNIFICANT CHANGE UP
LYMPHOCYTES # BLD AUTO: 2.36 K/UL — SIGNIFICANT CHANGE UP (ref 1–3.3)
LYMPHOCYTES # BLD AUTO: 38.8 % — SIGNIFICANT CHANGE UP (ref 13–44)
MAGNESIUM SERPL-MCNC: 2.2 MG/DL — SIGNIFICANT CHANGE UP (ref 1.6–2.6)
MCHC RBC-ENTMCNC: 29.6 PG — SIGNIFICANT CHANGE UP (ref 27–34)
MCHC RBC-ENTMCNC: 33.8 % — SIGNIFICANT CHANGE UP (ref 32–36)
MCV RBC AUTO: 87.4 FL — SIGNIFICANT CHANGE UP (ref 80–100)
METHADONE UR-MCNC: NEGATIVE — SIGNIFICANT CHANGE UP
MONOCYTES # BLD AUTO: 0.3 K/UL — SIGNIFICANT CHANGE UP (ref 0–0.9)
MONOCYTES NFR BLD AUTO: 4.9 % — SIGNIFICANT CHANGE UP (ref 2–14)
NEUTROPHILS # BLD AUTO: 3.14 K/UL — SIGNIFICANT CHANGE UP (ref 1.8–7.4)
NEUTROPHILS NFR BLD AUTO: 51.5 % — SIGNIFICANT CHANGE UP (ref 43–77)
NITRITE UR-MCNC: NEGATIVE — SIGNIFICANT CHANGE UP
NRBC # FLD: 0 — SIGNIFICANT CHANGE UP
OPIATES UR-MCNC: NEGATIVE — SIGNIFICANT CHANGE UP
OXYCODONE UR-MCNC: NEGATIVE — SIGNIFICANT CHANGE UP
PCO2 BLDV: 40 MMHG — LOW (ref 41–51)
PCP UR-MCNC: NEGATIVE — SIGNIFICANT CHANGE UP
PH BLDV: 7.43 PH — SIGNIFICANT CHANGE UP (ref 7.32–7.43)
PH UR: 7.5 — SIGNIFICANT CHANGE UP (ref 4.6–8)
PLATELET # BLD AUTO: 155 K/UL — SIGNIFICANT CHANGE UP (ref 150–400)
PMV BLD: 10.1 FL — SIGNIFICANT CHANGE UP (ref 7–13)
PO2 BLDV: 73 MMHG — HIGH (ref 35–40)
POTASSIUM BLDV-SCNC: 3.5 MMOL/L — SIGNIFICANT CHANGE UP (ref 3.4–4.5)
POTASSIUM SERPL-MCNC: 3.8 MMOL/L — SIGNIFICANT CHANGE UP (ref 3.5–5.3)
POTASSIUM SERPL-SCNC: 3.8 MMOL/L — SIGNIFICANT CHANGE UP (ref 3.5–5.3)
PROT SERPL-MCNC: 6.2 G/DL — SIGNIFICANT CHANGE UP (ref 6–8.3)
PROT UR-MCNC: NEGATIVE MG/DL — SIGNIFICANT CHANGE UP
PROTHROM AB SERPL-ACNC: 12.1 SEC — SIGNIFICANT CHANGE UP (ref 9.8–13.1)
RBC # BLD: 4.7 M/UL — SIGNIFICANT CHANGE UP (ref 4.2–5.8)
RBC # FLD: 12.3 % — SIGNIFICANT CHANGE UP (ref 10.3–14.5)
SALICYLATES SERPL-MCNC: < 5 MG/DL — LOW (ref 15–30)
SAO2 % BLDV: 95.1 % — HIGH (ref 60–85)
SODIUM SERPL-SCNC: 142 MMOL/L — SIGNIFICANT CHANGE UP (ref 135–145)
SP GR SPEC: 1.02 — SIGNIFICANT CHANGE UP (ref 1–1.04)
TROPONIN T SERPL-MCNC: < 0.06 NG/ML — SIGNIFICANT CHANGE UP (ref 0–0.06)
UROBILINOGEN FLD QL: NORMAL MG/DL — SIGNIFICANT CHANGE UP
WBC # BLD: 6.09 K/UL — SIGNIFICANT CHANGE UP (ref 3.8–10.5)
WBC # FLD AUTO: 6.09 K/UL — SIGNIFICANT CHANGE UP (ref 3.8–10.5)
WBC UR QL: SIGNIFICANT CHANGE UP (ref 0–?)

## 2018-03-02 PROCEDURE — 70498 CT ANGIOGRAPHY NECK: CPT | Mod: 26

## 2018-03-02 PROCEDURE — 70496 CT ANGIOGRAPHY HEAD: CPT | Mod: 26

## 2018-03-02 PROCEDURE — 74176 CT ABD & PELVIS W/O CONTRAST: CPT | Mod: 26

## 2018-03-02 PROCEDURE — 99285 EMERGENCY DEPT VISIT HI MDM: CPT | Mod: GC

## 2018-03-02 PROCEDURE — 71045 X-RAY EXAM CHEST 1 VIEW: CPT | Mod: 26

## 2018-03-02 RX ORDER — SODIUM CHLORIDE 9 MG/ML
1000 INJECTION INTRAMUSCULAR; INTRAVENOUS; SUBCUTANEOUS ONCE
Qty: 0 | Refills: 0 | Status: COMPLETED | OUTPATIENT
Start: 2018-03-02 | End: 2018-03-02

## 2018-03-02 RX ORDER — ONDANSETRON 8 MG/1
4 TABLET, FILM COATED ORAL ONCE
Qty: 0 | Refills: 0 | Status: COMPLETED | OUTPATIENT
Start: 2018-03-02 | End: 2018-03-02

## 2018-03-02 RX ADMIN — ONDANSETRON 4 MILLIGRAM(S): 8 TABLET, FILM COATED ORAL at 09:49

## 2018-03-02 RX ADMIN — SODIUM CHLORIDE 1000 MILLILITER(S): 9 INJECTION INTRAMUSCULAR; INTRAVENOUS; SUBCUTANEOUS at 09:49

## 2018-03-02 RX ADMIN — SODIUM CHLORIDE 1000 MILLILITER(S): 9 INJECTION INTRAMUSCULAR; INTRAVENOUS; SUBCUTANEOUS at 10:16

## 2018-03-02 NOTE — ED PROVIDER NOTE - PROGRESS NOTE DETAILS
Jaret PGY1: Patient has not continued   Patient just sat down on the floor, initially could not move and was not responding to the nurse, however a few minutes later verbally responsive,    -Medical doctor Sharona [phone]  - Nurse Ms Hannon Jaret PGY1: Patient standing without difficulty, irritable, tolerating PO, asking for more apple juice

## 2018-03-02 NOTE — CONSULT NOTE ADULT - ASSESSMENT
Pt is a 78 yo RH  M from [facility] with a PMH of Schizoaffective d/o and HLD who is presenting for an episode of near near syncope in the setting of hypotension, also complaining of transient slurred speech during the episode now all improved. Exam is unremarkable except for lightheadedness on getting up from bed and positive orthostatics. Head CT/CTA and Neck CTA unremarkable. Likely orthostatic hypotension. NIHSS: 0. MRS:2.    Recommendations:  - No further neurovascular w/u needed

## 2018-03-02 NOTE — ED ADULT NURSE NOTE - CHIEF COMPLAINT QUOTE
appears lethargic with brisk response to verbal stimuli    vomited aspirin in field  89/59 [facility] EMS started fluid temp not registering in triage f/s 165

## 2018-03-02 NOTE — ED ADULT NURSE REASSESSMENT NOTE - NS ED NURSE REASSESS COMMENT FT1
VSS, no s/s of acute distress, pt r/o stroke. pt able to ambulate, no nusea/ vomitting at this time. pt resting comfortablely. aide at bedside

## 2018-03-02 NOTE — ED PROVIDER NOTE - OBJECTIVE STATEMENT
77M hx schizoaffective d/o on haldol, left kidney stone, 77M hx schizoaffective d/o on haldol, left kidney stone, and HTN on metoprolol, c/o of nausea/ vomiting/ and syncopal episode this AM, sudden onset, as per EMS was reported to be normal prior this morning. Patient endorsing difficulty swallowing, had 2 episodes of NBNB emesis. No reports of fevers/ diarrhea/ sick contacts recently, has nurse from facility at bedside.    EMS reported , BP 78 Systolic 77M from [facility] with hx schizoaffective d/o on haldol, left kidney stone, and HTN on metoprolol, c/o of nausea/ vomiting/ and syncopal episode this AM, sudden onset, as per EMS was reported to be normal prior this morning. Patient endorsing difficulty swallowing, had 2 episodes of NBNB emesis. No reports of fevers/ diarrhea/ sick contacts recently, has nurse from facility at bedside (translating mandarin). Otherwise, no chest pain/palpitations/ sob/ no headache or visual changes endorsed.     EMS reported , BP 78 Systolic

## 2018-03-02 NOTE — ED PROVIDER NOTE - MEDICAL DECISION MAKING DETAILS
77M from [place], on metoprolol and haldol, with hypotension, vomiting and AMS, unable to fully assess neuro exam but due to alteration and inability to fully cooperate with preceding syncopal episode,  calling code stroke, will scan abdomen given acute emesis, check labs, rule out infection, rehydrate with IVF

## 2018-03-02 NOTE — ED PROVIDER NOTE - PLAN OF CARE
You were seen today for falling and vomiting. You had near syncope due to low blood pressures. You had your electrolytes checked which were normal, a CT abdomen and pelvis and an xray. Your xray showed infiltrates but you do not have any respiratory symptoms. Your tox screen and results were otherwise negative. You were given 2L of fluids. You should return to the emergency room for chest pain, shortness of breath or fainting. Case was discussed with Dr Tabor at [facility]    1) Please follow-up with your primary care doctor within the next 3 days.  Please call today or tomorrow for an appointment.  If you cannot follow-up with your doctor(s), please return to the ED for any urgent issues.  2) If you have any worsening of symptoms or any other concerns please return to the ED immediately.  3) Please continue taking your home medications as directed.  4) You may have been given a copy of your labs and/or imaging.  Please go over these with your primary care doctor.

## 2018-03-02 NOTE — ED PROVIDER NOTE - PHYSICAL EXAMINATION
AAOx3, vomiting, listless with intermittent agitation   Head: NCAT  ENT: Airway patent, moist mucous membranes, nasal passageways clear, no pharyngeal erythema or exudates, uvula midline, no cervical lymphadenopathy  Cardiac: Normal rate, normal rhythm, no murmurs/rubs/gallops appreciated  Respiratory: Lungs CTA B/L  Gastrointestinal: +BS, Abdomen soft, nontender, nondistended, no rebound, no guarding, no organomegaly   MSK: No gross abnormalities, FROM of all four extremities, no edema  HEME: Extremities warm, pulses intact and symmetrical in all four extremities  Skin: No rashes, no lesions  Neuro: No gross neurologic deficits, CN II-XII intact, no facial asymmetry, no dysarthria, dysdiadochokinesia, strength equal in all four extremities, no gait abnormality AAOx3, vomiting, listless with intermittent agitation   Head: NCAT  ENT: Airway patent, moist mucous membranes, no JVD, no cervical lymphadenopathy, PERRLA, EOM grossly intact, no conjunctival pallor  Cardiac: Normal rate, normal rhythm, no murmurs/rubs/gallops appreciated  Respiratory: Lungs CTA B/L  Gastrointestinal:  Abdomen soft, nontender, nondistended, no rebound, no guarding,  MSK: No gross abnormalities, FROM of all four extremities, no edema  HEME: Extremities warm, pulses intact and symmetrical in all four extremities  Skin: No rashes, no lesions  Neuro: No gross neurologic deficits, no facial droop, responds to commands, moves all extremities, not compliant with entire exam, questionably dysarthria, with frequent relapses of closing eyes and not responding

## 2018-03-02 NOTE — ED PROVIDER NOTE - ATTENDING CONTRIBUTION TO CARE
I performed a face to face bedside interview with patient regarding history of present illness, review of symptoms and past medical history. I completed an independent physical exam.  I have discussed patient's plan of care.   I agree with note as stated above, having amended the EMR as needed to reflect my findings. I have discussed the assessment and plan of care.  This includes during the time I functioned as the attending physician for this patient.  Attending Contribution to Care: agree with plan of resident. pt p/w mild slurred speech as per hha, pt p/w 3 episodes of nb nb vomiting, bp paroxysmal between normotensive and hypotensive. pt stable. hd stable at this point. vss.

## 2018-03-02 NOTE — ED ADULT NURSE NOTE - OBJECTIVE STATEMENT
pt is a 77 yr old male resident of [facility] c/o chest pain/ sob/ weakness. pt actively vomitting. pt speaking manderain with aide at bedside. pt aaox 3 moving all extremities. pt c/o difficulty speaking, code stroke called, 2nd piv placed, labs sent, rectal temp done, see emar for tx. will ctm

## 2018-03-02 NOTE — CONSULT NOTE ADULT - SUBJECTIVE AND OBJECTIVE BOX
HPI:  Pt is a 78 yo RH  M from [facility] with a PMH of Schizoaffective d/o and HLD who is presenting for an episode of near near syncope in the setting of hypotension, also complaining of transient slurred speech during the episode now all improved. Pt and  at bedside both note that he got up and felt lightheaded, then fell to the ground, no LOC, tongue biting, urine/bowel loss or seizure like activity. Otherwise no recent fevers, HA, focal weakness, numbness. NIHSS: 0. MRS:2.      MEDICATIONS  (STANDING):    MEDICATIONS  (PRN):    PAST MEDICAL & SURGICAL HISTORY:  Schizoaffective disorder  Hypercholesterolemia  No significant past surgical history    FAMILY HISTORY:  No pertinent family history in first degree relatives    Allergies    No Known Allergies    Intolerances    SHx - No smoking, No ETOH, No drug abuse    Review of Systems:  See HPI.    Vital Signs Last 24 Hrs  T(C): 36 (02 Mar 2018 09:30), Max: 36 (02 Mar 2018 09:30)  T(F): 96.8 (02 Mar 2018 09:30), Max: 96.8 (02 Mar 2018 09:30)  HR: 68 (02 Mar 2018 10:06) (64 - 68)  BP: 126/69 (02 Mar 2018 10:06) (102/70 - 126/69)  BP(mean): --  RR: 16 (02 Mar 2018 10:06) (16 - 20)  SpO2: 100% (02 Mar 2018 10:06) (97% - 100%)      General Exam:   General appearance: No acute distress  Orthostatics:  - Laying: BP: 134/80, HR: 76  - Sitting: BP: 127/69, HR: 72  - Standing: BP: 110/80, HR: 88  Neurological Exam:  Mental Status: Orientated to self and date (baseline).  Attention intact.  No dysarthria. Speech fluent.  Cranial Nerves: PERRL, EOMI, VFF grossly, no nystagmus. CN V1-3 intact to light touch b/l.  No facial asymmetry. Tongue, uvula and palate midline.  Sternocleidomastoid and Trapezius intact bilaterally.    Motor:   Tone: normal.                  Strength:     [] Upper extremity                      Delt       Bicep    Tricep                                                  R         5/5        5/5        5/5       5/5                                               L          5/5        5/5        5/5       5/5  [] Lower extremity                       HF          KE          KF        DF         PF                                               R        5/5 5/5 5/5 5/5 5/5                                               L         5/5 5/5 5/5 5/5 5/5  Pronator drift: none b/l                Dysmetria: None to finger-nose-finger b/l  No truncal ataxia.    Tremor: No resting, postural or action tremor.  No myoclonus.    Sensation: intact to light touch and noxious stimuli b/l without extinction    Deep Tendon Reflexes:              Biceps      BR        Patellar        Ankle        Babinski                                         R       1+           1+        1+               0             downgoing                                         L        1+           1+        1+               0             downgoing      03-02    142  |  107  |  13  ----------------------------<  138<H>  3.8   |  25  |  0.70    Ca    8.3<L>      02 Mar 2018 09:30    TPro  6.2  /  Alb  3.4  /  TBili  0.9  /  DBili  x   /  AST  26  /  ALT  37  /  AlkPhos  46  03-02                            13.9   6.09  )-----------( 155      ( 02 Mar 2018 09:30 )             41.1       Radiology:  < from: CT Brain Stroke Protocol (03.02.18 @ 10:44) >  Impression: Noncontrast head CT is limited by motion. Grossly, no   evidence acute hemorrhage mass, mass effect or acute territorial infarct   seen at this time. If symptoms continue repeat head CT or MRI can be done.    Unremarkable CTA of the neck and [place].

## 2018-03-03 LAB
SPECIMEN SOURCE: SIGNIFICANT CHANGE UP
SPECIMEN SOURCE: SIGNIFICANT CHANGE UP

## 2018-03-04 LAB
BACTERIA UR CULT: SIGNIFICANT CHANGE UP
SPECIMEN SOURCE: SIGNIFICANT CHANGE UP

## 2018-03-06 DIAGNOSIS — R69 ILLNESS, UNSPECIFIED: ICD-10-CM

## 2018-03-07 LAB
BACTERIA BLD CULT: SIGNIFICANT CHANGE UP
BACTERIA BLD CULT: SIGNIFICANT CHANGE UP

## 2018-08-01 ENCOUNTER — OUTPATIENT (OUTPATIENT)
Dept: OUTPATIENT SERVICES | Facility: HOSPITAL | Age: 78
LOS: 1 days | End: 2018-08-01
Payer: MEDICARE

## 2018-08-01 PROCEDURE — G9001: CPT

## 2018-08-17 ENCOUNTER — EMERGENCY (EMERGENCY)
Facility: HOSPITAL | Age: 78
LOS: 1 days | Discharge: HOME | End: 2018-08-17
Attending: EMERGENCY MEDICINE | Admitting: EMERGENCY MEDICINE
Payer: MEDICAID

## 2018-08-17 VITALS
HEART RATE: 78 BPM | OXYGEN SATURATION: 99 % | SYSTOLIC BLOOD PRESSURE: 148 MMHG | DIASTOLIC BLOOD PRESSURE: 92 MMHG | RESPIRATION RATE: 16 BRPM

## 2018-08-17 VITALS
OXYGEN SATURATION: 96 % | TEMPERATURE: 98 F | DIASTOLIC BLOOD PRESSURE: 68 MMHG | SYSTOLIC BLOOD PRESSURE: 100 MMHG | RESPIRATION RATE: 18 BRPM | HEART RATE: 64 BPM

## 2018-08-17 PROBLEM — F25.9 SCHIZOAFFECTIVE DISORDER, UNSPECIFIED: Chronic | Status: ACTIVE | Noted: 2017-03-23

## 2018-08-17 LAB
ALBUMIN SERPL ELPH-MCNC: 4.1 G/DL — SIGNIFICANT CHANGE UP (ref 3.3–5)
ALP SERPL-CCNC: 54 U/L — SIGNIFICANT CHANGE UP (ref 40–120)
ALT FLD-CCNC: 65 U/L — HIGH (ref 4–41)
APPEARANCE UR: CLEAR — SIGNIFICANT CHANGE UP
AST SERPL-CCNC: 39 U/L — SIGNIFICANT CHANGE UP (ref 4–40)
BACTERIA # UR AUTO: NEGATIVE HPF — SIGNIFICANT CHANGE UP
BASOPHILS # BLD AUTO: 0.04 K/UL — SIGNIFICANT CHANGE UP (ref 0–0.2)
BASOPHILS NFR BLD AUTO: 0.5 % — SIGNIFICANT CHANGE UP (ref 0–2)
BILIRUB SERPL-MCNC: 1.1 MG/DL — SIGNIFICANT CHANGE UP (ref 0.2–1.2)
BILIRUB UR-MCNC: NEGATIVE — SIGNIFICANT CHANGE UP
BLOOD UR QL VISUAL: NEGATIVE — SIGNIFICANT CHANGE UP
BUN SERPL-MCNC: 18 MG/DL — SIGNIFICANT CHANGE UP (ref 7–23)
CALCIUM SERPL-MCNC: 10 MG/DL — SIGNIFICANT CHANGE UP (ref 8.4–10.5)
CHLORIDE SERPL-SCNC: 103 MMOL/L — SIGNIFICANT CHANGE UP (ref 98–107)
CO2 SERPL-SCNC: 23 MMOL/L — SIGNIFICANT CHANGE UP (ref 22–31)
COLOR SPEC: SIGNIFICANT CHANGE UP
CREAT SERPL-MCNC: 0.69 MG/DL — SIGNIFICANT CHANGE UP (ref 0.5–1.3)
EOSINOPHIL # BLD AUTO: 0.28 K/UL — SIGNIFICANT CHANGE UP (ref 0–0.5)
EOSINOPHIL NFR BLD AUTO: 3.4 % — SIGNIFICANT CHANGE UP (ref 0–6)
GLUCOSE SERPL-MCNC: 114 MG/DL — HIGH (ref 70–99)
GLUCOSE UR-MCNC: NEGATIVE — SIGNIFICANT CHANGE UP
HCT VFR BLD CALC: 42.1 % — SIGNIFICANT CHANGE UP (ref 39–50)
HGB BLD-MCNC: 14.6 G/DL — SIGNIFICANT CHANGE UP (ref 13–17)
IMM GRANULOCYTES # BLD AUTO: 0.02 # — SIGNIFICANT CHANGE UP
IMM GRANULOCYTES NFR BLD AUTO: 0.2 % — SIGNIFICANT CHANGE UP (ref 0–1.5)
KETONES UR-MCNC: NEGATIVE — SIGNIFICANT CHANGE UP
LEUKOCYTE ESTERASE UR-ACNC: NEGATIVE — SIGNIFICANT CHANGE UP
LYMPHOCYTES # BLD AUTO: 2.04 K/UL — SIGNIFICANT CHANGE UP (ref 1–3.3)
LYMPHOCYTES # BLD AUTO: 24.9 % — SIGNIFICANT CHANGE UP (ref 13–44)
MCHC RBC-ENTMCNC: 31.2 PG — SIGNIFICANT CHANGE UP (ref 27–34)
MCHC RBC-ENTMCNC: 34.7 % — SIGNIFICANT CHANGE UP (ref 32–36)
MCV RBC AUTO: 90 FL — SIGNIFICANT CHANGE UP (ref 80–100)
MONOCYTES # BLD AUTO: 0.5 K/UL — SIGNIFICANT CHANGE UP (ref 0–0.9)
MONOCYTES NFR BLD AUTO: 6.1 % — SIGNIFICANT CHANGE UP (ref 2–14)
NEUTROPHILS # BLD AUTO: 5.3 K/UL — SIGNIFICANT CHANGE UP (ref 1.8–7.4)
NEUTROPHILS NFR BLD AUTO: 64.9 % — SIGNIFICANT CHANGE UP (ref 43–77)
NITRITE UR-MCNC: NEGATIVE — SIGNIFICANT CHANGE UP
NRBC # FLD: 0 — SIGNIFICANT CHANGE UP
PH UR: 7 — SIGNIFICANT CHANGE UP (ref 5–8)
PLATELET # BLD AUTO: 144 K/UL — LOW (ref 150–400)
PMV BLD: 10 FL — SIGNIFICANT CHANGE UP (ref 7–13)
POTASSIUM SERPL-MCNC: 3.9 MMOL/L — SIGNIFICANT CHANGE UP (ref 3.5–5.3)
POTASSIUM SERPL-SCNC: 3.9 MMOL/L — SIGNIFICANT CHANGE UP (ref 3.5–5.3)
PROT SERPL-MCNC: 7 G/DL — SIGNIFICANT CHANGE UP (ref 6–8.3)
PROT UR-MCNC: NEGATIVE — SIGNIFICANT CHANGE UP
RBC # BLD: 4.68 M/UL — SIGNIFICANT CHANGE UP (ref 4.2–5.8)
RBC # FLD: 12.8 % — SIGNIFICANT CHANGE UP (ref 10.3–14.5)
RBC CASTS # UR COMP ASSIST: SIGNIFICANT CHANGE UP HPF
SODIUM SERPL-SCNC: 141 MMOL/L — SIGNIFICANT CHANGE UP (ref 135–145)
SP GR SPEC: 1.02 — SIGNIFICANT CHANGE UP (ref 1–1.04)
TROPONIN T, HIGH SENSITIVITY: 17 NG/L — SIGNIFICANT CHANGE UP (ref ?–14)
UROBILINOGEN FLD QL: NORMAL — SIGNIFICANT CHANGE UP
WBC # BLD: 8.18 K/UL — SIGNIFICANT CHANGE UP (ref 3.8–10.5)
WBC # FLD AUTO: 8.18 K/UL — SIGNIFICANT CHANGE UP (ref 3.8–10.5)
WBC CASTS UR QL COMP ASSIST: NEGATIVE LPF — SIGNIFICANT CHANGE UP
WBC UR QL: SIGNIFICANT CHANGE UP HPF

## 2018-08-17 PROCEDURE — 72125 CT NECK SPINE W/O DYE: CPT | Mod: 26

## 2018-08-17 PROCEDURE — 99284 EMERGENCY DEPT VISIT MOD MDM: CPT | Mod: 25

## 2018-08-17 PROCEDURE — 70450 CT HEAD/BRAIN W/O DYE: CPT | Mod: 26

## 2018-08-17 NOTE — PROVIDER CONTACT NOTE (OTHER) - BACKGROUND
ED Resident asked ED SW to assist with transportation back to [facility] [address] [facility] today as patient is medically cleared.  SW set up ambulance through [facility] for approximately

## 2018-08-17 NOTE — ED ADULT NURSE NOTE - ED STAT RN HANDOFF DETAILS
pt has no complaints at time of discharge, pt to return to [facility] with [facility] ambulance

## 2018-08-17 NOTE — ED ADULT NURSE NOTE - NSIMPLEMENTINTERV_GEN_ALL_ED
Implemented All Fall with Harm Risk Interventions:  [place] to call system. Call bell, personal items and telephone within reach. Instruct patient to call for assistance. Room bathroom lighting operational. Non-slip footwear when patient is off stretcher. Physically safe environment: no spills, clutter or unnecessary equipment. Stretcher in lowest position, wheels locked, appropriate side rails in place. Provide visual cue, wrist band, yellow gown, etc. Monitor gait and stability. Monitor for mental status changes and reorient to person, place, and time. Review medications for side effects contributing to fall risk. Reinforce activity limits and safety measures with patient and family. Provide visual clues: red socks.

## 2018-08-17 NOTE — ED ADULT NURSE REASSESSMENT NOTE - NS ED NURSE REASSESS COMMENT FT1
Received pt from [facility], pt is discharged, currently waiting for ambulette, Received pt from [facility], pt is discharged, currently waiting for ambulette,  to call [facility] for  back to facility, pt primarily mandarin speaking but can communicate some in english, offers no complaints, IV removed prior to arrival, pt on enhanced safety being watched by pca but has been calm and cooperative, easy to redirect, iv removed by MD, pt waiting for . Will continue to monitor. Received pt from [facility], pt is discharged, currently waiting for ambulette,  to call [facility] for  back to facility, pt primarily mandarin speaking but can communicate some in english, offers no complaints, pt on enhanced safety being watched by pca but has been calm and cooperative, iv removed by MD, pt waiting for . Will continue to monitor.

## 2018-08-17 NOTE — ED PROVIDER NOTE - MEDICAL DECISION MAKING DETAILS
78y male with PMH of schizoaffective disorder presenting after a fall out of bed, hit head, no LOC, patient has no complaints, not on blood thinners.  CT head and neck, if clear then discharge back to [facility], call social work to arrange for ambulette.

## 2018-08-17 NOTE — ED PROVIDER NOTE - PROGRESS NOTE DETAILS
Aaron Alcantar, PGY-1 EM: patient ct negative, ready for discharge, but needs transport back to [facility]. received sign out on patient. pt transport has been arranged. will provide DC summary and copy of results.  -yael moreland,  pgy1

## 2018-08-17 NOTE — ED ADULT TRIAGE NOTE - CHIEF COMPLAINT QUOTE
BIBA from [facility] from [address].  Pt mandarin speaking.  pacific  # [id number] used.   Pt had a fall from bed.  Per pt, "he rolled out of bed to tile floor while asleep."  Per EMS [facility] staff rpts pt is AMS from baseline.  Upon interview with  phone pt is A&Ox3.  Pt has a hematoma to L check area.  Pt denies LOC.  ekg in progress.  PT has hx schizoaffective bipolar disorder.

## 2018-08-17 NOTE — ED ADULT NURSE NOTE - CHIEF COMPLAINT QUOTE
BIBA from [facility] from [address].  Pt mandarin speaking.  pacific  # [phone] used.   Pt had a fall from bed.  Per pt, "he rolled out of bed to tile floor while asleep."  Per EMS [facility] staff rpts pt is AMS from baseline.  Upon interview with  phone pt is A&Ox3.  Pt has a hematoma to L check area.  Pt denies LOC.  ekg in progress.  PT has hx schizoaffective bipolar disorder.

## 2018-08-17 NOTE — ED ADULT NURSE REASSESSMENT NOTE - NS ED NURSE REASSESS COMMENT FT1
Pt. noted to be climbing out of bed. placed on enhanced supervision at 6:15am. no acute distress noted. will continue to monitor. St

## 2018-08-17 NOTE — ED PROVIDER NOTE - CARE PLAN
Principal Discharge DX:	Fall  Assessment and plan of treatment:	Take all medications as directed.  For pain take Ibuprofen (Motrin, Advil) 400mg-600mg every 6-8 hours or Acetaminophen (Tylenol) 250mg-650mg every 6-8 hours.  Follow up with your primary physician in 3-4 days.  You were given copies of all labs and imaging results from this ER visit, please take them to your appointment.  Return to the ER for worsening symptoms, such as but not limited to headaches, dizziness, nausea or vomiting.

## 2018-08-17 NOTE — PROVIDER CONTACT NOTE (OTHER) - ASSESSMENT
11L30 a.m. today. 11:30 a.m. today.  ROBERTO spoke to Mr. Jonatan Unger, Treatment  at [facility] [phone] and he is aware of patient's return and in agreement with discharge plan.

## 2018-08-17 NOTE — ED PROVIDER NOTE - OBJECTIVE STATEMENT
78y male with PMH of schizoaffective presenting after a fall.  He states that he was sleeping when he fell out of bed.  He was able to get up with out assistance.  He hit his head, but denies: head ache, LOC, chest pain, neck pain, back pain, dizziness.  He in not taking aspirin or any anticoagulants or antiplatelets. 78y male with PMH of schizoaffective presenting after a fall.  Patient speaks manderin history acquired with an , pacific He states that he was sleeping when he fell out of bed.  He was able to get up with out assistance.  He hit his head, but denies: head ache, LOC, chest pain, neck pain, back pain, dizziness.  He in not taking aspirin or any anticoagulants or antiplatelets. 78y male with PMH of schizoaffective presenting after a fall.  Patient speaks Mandarin history acquired with an , pacific He states that he was sleeping when he fell out of bed.  He was able to get up with out assistance.  He hit his head, but denies: head ache, LOC, chest pain, neck pain, back pain, dizziness.  He in not taking aspirin or any anticoagulants or antiplatelets.

## 2018-08-17 NOTE — ED PROVIDER NOTE - PLAN OF CARE
Take all medications as directed.  For pain take Ibuprofen (Motrin, Advil) 400mg-600mg every 6-8 hours or Acetaminophen (Tylenol) 250mg-650mg every 6-8 hours.  Follow up with your primary physician in 3-4 days.  You were given copies of all labs and imaging results from this ER visit, please take them to your appointment.  Return to the ER for worsening symptoms, such as but not limited to headaches, dizziness, nausea or vomiting.

## 2018-08-18 LAB
BACTERIA UR CULT: SIGNIFICANT CHANGE UP
SPECIMEN SOURCE: SIGNIFICANT CHANGE UP

## 2018-08-20 DIAGNOSIS — Z71.89 OTHER SPECIFIED COUNSELING: ICD-10-CM

## 2020-12-10 NOTE — ED PROVIDER NOTE - PROGRESS NOTE
Radiology Discharge Summary      Admit date: 12/10/2020  7:40 AM  Discharge date: December 10, 2020    Instructions Given to patient: YesVerbal    Diet: Regular    Activity:NO Restrictions    Medications on discharge (List): Refer to Discharge Medication List    Hospital Course: uneventul    Description of Condition on Discharge: Chief Complaint Resolved    Discharge Disposition: Home    Discharge Diagnosis: r renal calculus        
Stable.

## 2021-02-14 NOTE — ED ADULT TRIAGE NOTE - CHIEF COMPLAINT QUOTE
appears lethargic with brisk response to verbal stimuli    vomited aspirin in field  89/59 [facility] EMS started fluid temp not registering in triage f/s 165
None known

## 2022-01-27 NOTE — PROVIDER CONTACT NOTE (OTHER) - REASON
Foot exam done today, urged NOT to walk barefoot and to call w/any s/sx of infection or ulcers  Lab Results   Component Value Date    HGBA1C 8 5 (H) 01/24/2022 Transportation

## 2022-04-08 NOTE — ED ADULT NURSE NOTE - OBJECTIVE STATEMENT
1st attempt - [email or web address] message sent for patient to contact the office to schedule an appointment; see notes below. Pt. from [facility]; s/p fall out of bed. noted with abrasion to L side of face. Pt. is Mandarine speaking. no distress noted. vitlas stable. will continue to monitor. ST

## 2022-04-29 NOTE — ED PROVIDER NOTE - CARE PLAN
Principal Discharge DX:	Vomiting  Assessment and plan of treatment:	You were seen today for falling and vomiting. You had near syncope due to low blood pressures. You had your electrolytes checked which were normal, a CT abdomen and pelvis and an xray. Your xray showed infiltrates but you do not have any respiratory symptoms. Your tox screen and results were otherwise negative. You were given 2L of fluids. You should return to the emergency room for chest pain, shortness of breath or fainting. Case was discussed with Dr Tabor at [facility]    1) Please follow-up with your primary care doctor within the next 3 days.  Please call today or tomorrow for an appointment.  If you cannot follow-up with your doctor(s), please return to the ED for any urgent issues.  2) If you have any worsening of symptoms or any other concerns please return to the ED immediately.  3) Please continue taking your home medications as directed.  4) You may have been given a copy of your labs and/or imaging.  Please go over these with your primary care doctor.
None known

## 2022-09-09 NOTE — PATIENT PROFILE ADULT. - HEALTHCARE INFORMATION NEEDED, PROFILE
[facility] vna left a message about asking us to order Denkins? I believe for a sacral wound as per vna     They also are awsking to order compression stockings for left leg edema for pt please  Lats they also want us to know pt has started asprin 81 mg again , but wasn't told to do so by any drs       Any question, can be reached at  none

## 2024-09-26 NOTE — ED ADULT NURSE NOTE - PRO INTERPRETER NEED 2
Chinese Depth Of Biopsy: dermis Billing Type: Third-Party Bill Require Size: No Electrodesiccation And Curettage Text: The wound bed was treated with electrodesiccation and curettage after the biopsy was performed. Consent was obtained and risks were reviewed including but not limited to scarring, infection, bleeding, scabbing, incomplete removal, nerve damage and allergy to anesthesia. Dressing: pressure dressing with telfa Post-Care Instructions: I reviewed with the patient in detail post-care instructions. Patient is to keep the biopsy site dry overnight, wash with soap and water and then apply ointment daily healed. Biopsy Method: double edge Personna blade Wound Care: Bacitracin Type Of Destruction Used: Curettage Information: Selecting Yes will display possible errors in your note based on the variables you have selected. This validation is only offered as a suggestion for you. PLEASE NOTE THAT THE VALIDATION TEXT WILL BE REMOVED WHEN YOU FINALIZE YOUR NOTE. IF YOU WANT TO FAX A PRELIMINARY NOTE YOU WILL NEED TO TOGGLE THIS TO 'NO' IF YOU DO NOT WANT IT IN YOUR FAXED NOTE. Render Post-Care Instructions In Note?: yes Curettage Text: The wound bed was treated with curettage after the biopsy was performed. Notification Instructions: Patient will be notified of biopsy results. However, patient instructed to call the office if not contacted within 2 weeks. Biopsy Type: H and E Body Location Override (Optional - Billing Will Still Be Based On Selected Body Map Location If Applicable): right proximal infra mandibular Silver Nitrate Text: The wound bed was treated with silver nitrate after the biopsy was performed. Anesthesia Type: 0.5% lidocaine with 1:200,000 epinephrine and a 1:10 solution of 8.4% sodium bicarbonate Anesthesia Volume In Cc: 2 Detail Level: Simple Size Of Lesion In Cm: 0.6 Electrodesiccation Text: The wound bed was treated with electrodesiccation after the biopsy was performed. Cryotherapy Text: The wound bed was treated with cryotherapy after the biopsy was performed. X Size Of Lesion In Cm: 0 Hemostasis: Aluminum Chloride
